# Patient Record
Sex: FEMALE | Race: BLACK OR AFRICAN AMERICAN | Employment: FULL TIME | URBAN - METROPOLITAN AREA
[De-identification: names, ages, dates, MRNs, and addresses within clinical notes are randomized per-mention and may not be internally consistent; named-entity substitution may affect disease eponyms.]

---

## 2024-07-24 ENCOUNTER — HOSPITAL ENCOUNTER (EMERGENCY)
Age: 29
Discharge: HOME | End: 2024-07-24

## 2024-07-24 ENCOUNTER — APPOINTMENT (OUTPATIENT)
Dept: CT IMAGING | Age: 29
End: 2024-07-24

## 2024-07-24 VITALS
BODY MASS INDEX: 41.65 KG/M2 | HEART RATE: 71 BPM | HEIGHT: 65 IN | SYSTOLIC BLOOD PRESSURE: 110 MMHG | RESPIRATION RATE: 16 BRPM | DIASTOLIC BLOOD PRESSURE: 63 MMHG | OXYGEN SATURATION: 97 % | WEIGHT: 250 LBS | TEMPERATURE: 98.2 F

## 2024-07-24 DIAGNOSIS — N39.0 URINARY TRACT INFECTION, BACTERIAL: Primary | ICD-10-CM

## 2024-07-24 DIAGNOSIS — A49.9 URINARY TRACT INFECTION, BACTERIAL: Primary | ICD-10-CM

## 2024-07-24 LAB
ALBUMIN SERPL-MCNC: 4.2 GM/DL (ref 3.4–5)
ALP BLD-CCNC: 93 IU/L (ref 40–128)
ALT SERPL-CCNC: 14 U/L (ref 10–40)
ANION GAP SERPL CALCULATED.3IONS-SCNC: 12 MMOL/L (ref 7–16)
AST SERPL-CCNC: 18 IU/L (ref 15–37)
BACTERIA: ABNORMAL /HPF
BASOPHILS ABSOLUTE: 0 K/CU MM
BASOPHILS RELATIVE PERCENT: 0.3 % (ref 0–1)
BILIRUB SERPL-MCNC: 0.2 MG/DL (ref 0–1)
BILIRUBIN, URINE: NEGATIVE MG/DL
BLOOD, URINE: ABNORMAL
BUN SERPL-MCNC: 17 MG/DL (ref 6–23)
CALCIUM SERPL-MCNC: 9.8 MG/DL (ref 8.3–10.6)
CHLORIDE BLD-SCNC: 107 MMOL/L (ref 99–110)
CLARITY, UA: ABNORMAL
CO2: 24 MMOL/L (ref 21–32)
COLOR, UA: YELLOW
CREAT SERPL-MCNC: 1 MG/DL (ref 0.6–1.1)
DIFFERENTIAL TYPE: ABNORMAL
EOSINOPHILS ABSOLUTE: 0.2 K/CU MM
EOSINOPHILS RELATIVE PERCENT: 1.5 % (ref 0–3)
GFR, ESTIMATED: 80 ML/MIN/1.73M2
GLUCOSE SERPL-MCNC: 91 MG/DL (ref 70–99)
GLUCOSE URINE: NEGATIVE MG/DL
HCT VFR BLD CALC: 43.6 % (ref 37–47)
HEMOGLOBIN: 13.6 GM/DL (ref 12.5–16)
IMMATURE NEUTROPHIL %: 0.2 % (ref 0–0.43)
INTERPRETATION: NORMAL
KETONES, URINE: ABNORMAL MG/DL
LEUKOCYTE ESTERASE, URINE: ABNORMAL
LIPASE: 35 IU/L (ref 13–60)
LYMPHOCYTES ABSOLUTE: 3 K/CU MM
LYMPHOCYTES RELATIVE PERCENT: 27.3 % (ref 24–44)
Lab: NORMAL
MCH RBC QN AUTO: 26.8 PG (ref 27–31)
MCHC RBC AUTO-ENTMCNC: 31.2 % (ref 32–36)
MCV RBC AUTO: 85.8 FL (ref 78–100)
MONOCYTES ABSOLUTE: 0.7 K/CU MM
MONOCYTES RELATIVE PERCENT: 6.6 % (ref 0–4)
MUCUS: ABNORMAL HPF
NEUTROPHILS ABSOLUTE: 7.1 K/CU MM
NEUTROPHILS RELATIVE PERCENT: 64.1 % (ref 36–66)
NITRITE URINE, QUANTITATIVE: NEGATIVE
NUCLEATED RBC %: 0 %
PDW BLD-RTO: 14.2 % (ref 11.7–14.9)
PH, URINE: 6 (ref 5–8)
PLATELET # BLD: 468 K/CU MM (ref 140–440)
PMV BLD AUTO: 9.6 FL (ref 7.5–11.1)
POTASSIUM SERPL-SCNC: 4.3 MMOL/L (ref 3.5–5.1)
PREGNANCY, URINE: NEGATIVE
PROTEIN UA: NEGATIVE MG/DL
RBC # BLD: 5.08 M/CU MM (ref 4.2–5.4)
RBC URINE: 4 /HPF (ref 0–6)
SODIUM BLD-SCNC: 143 MMOL/L (ref 135–145)
SPECIFIC GRAVITY UA: >1.03 (ref 1–1.03)
SPECIMEN: NORMAL
SQUAMOUS EPITHELIAL: 49 /HPF
TOTAL IMMATURE NEUTOROPHIL: 0.02 K/CU MM
TOTAL NUCLEATED RBC: 0 K/CU MM
TOTAL PROTEIN: 8.1 GM/DL (ref 6.4–8.2)
TRICHOMONAS: ABNORMAL /HPF
UROBILINOGEN, URINE: 0.2 MG/DL (ref 0.2–1)
WBC # BLD: 11 K/CU MM (ref 4–10.5)
WBC UA: 6 /HPF (ref 0–5)
WET PREP: NORMAL
WET PREP: NORMAL

## 2024-07-24 PROCEDURE — 96374 THER/PROPH/DIAG INJ IV PUSH: CPT

## 2024-07-24 PROCEDURE — 6360000002 HC RX W HCPCS

## 2024-07-24 PROCEDURE — 99285 EMERGENCY DEPT VISIT HI MDM: CPT

## 2024-07-24 PROCEDURE — 6360000004 HC RX CONTRAST MEDICATION

## 2024-07-24 PROCEDURE — 87086 URINE CULTURE/COLONY COUNT: CPT

## 2024-07-24 PROCEDURE — 74177 CT ABD & PELVIS W/CONTRAST: CPT

## 2024-07-24 PROCEDURE — 80053 COMPREHEN METABOLIC PANEL: CPT

## 2024-07-24 PROCEDURE — 83690 ASSAY OF LIPASE: CPT

## 2024-07-24 PROCEDURE — 85025 COMPLETE CBC W/AUTO DIFF WBC: CPT

## 2024-07-24 PROCEDURE — 81025 URINE PREGNANCY TEST: CPT

## 2024-07-24 PROCEDURE — 81001 URINALYSIS AUTO W/SCOPE: CPT

## 2024-07-24 PROCEDURE — 87210 SMEAR WET MOUNT SALINE/INK: CPT

## 2024-07-24 PROCEDURE — 87591 N.GONORRHOEAE DNA AMP PROB: CPT

## 2024-07-24 PROCEDURE — 87491 CHLMYD TRACH DNA AMP PROBE: CPT

## 2024-07-24 RX ORDER — SODIUM CHLORIDE 0.9 % (FLUSH) 0.9 %
5-40 SYRINGE (ML) INJECTION 2 TIMES DAILY
Status: DISCONTINUED | OUTPATIENT
Start: 2024-07-24 | End: 2024-07-25 | Stop reason: HOSPADM

## 2024-07-24 RX ORDER — CEPHALEXIN 500 MG/1
500 CAPSULE ORAL 4 TIMES DAILY
Qty: 28 CAPSULE | Refills: 0 | Status: SHIPPED | OUTPATIENT
Start: 2024-07-24 | End: 2024-07-31

## 2024-07-24 RX ORDER — KETOROLAC TROMETHAMINE 15 MG/ML
15 INJECTION, SOLUTION INTRAMUSCULAR; INTRAVENOUS ONCE
Status: COMPLETED | OUTPATIENT
Start: 2024-07-24 | End: 2024-07-24

## 2024-07-24 RX ADMIN — IOPAMIDOL 75 ML: 755 INJECTION, SOLUTION INTRAVENOUS at 20:11

## 2024-07-24 RX ADMIN — KETOROLAC TROMETHAMINE 15 MG: 15 INJECTION, SOLUTION INTRAMUSCULAR; INTRAVENOUS at 18:51

## 2024-07-24 NOTE — DISCHARGE INSTRUCTIONS
Leanna luis [facility]  2-1-1:   Enfòmasyon aurelia calderon  [phone] o 2-1-1  [email or web address]    Phu Godinez:  koupon kurt noreen, melany metzger, gadri sibvansyone, PRC  [phone]    [facility]:   noreen, èd ak lwjuse, rad ak b  [phone] or [phone]    Americo hendrickson:   monica sorto ak lwaye, rad ak b  [phone]    [facility] [address]:     [facility]    [address]  [address] [place] [address]  [phone]     fritz godinez yo  [address] [place] [address]  [phone]     [facility]:   klinik sante pedyatrik, klinik sante granmoun ak pemlasha dapre revni ou  [address] [place], [place]   [phone] o [phone]    [facility]ael barry [facility]:  asistans kurt [facility]: [phone]  [email or web address]: asistans kurt [facility]: [facility], klinik sante granmovan barry pemlasha selon revni ou  [address]   [place] [address]  [phone]    [facility] [place]:  Nitrisyon kurt michelle denise chwa selon revni  [address], [place] [id number] [phone]    [facility]:  ofri sèvis otobis [facility]. ADA ofri sèvis pòt an pòt kurt moun ki gen andikap  [phone]    Make yon vwayaj:    [phone]      [facility], Asitaye luis [facility], [address] and [address]  Kirby 4:00pm-7:00PM  Mekredi 4:00pm-7:00pm  Samdi 9:00am-12:00pm  ___________________________________________________________________                                                                                         OB[facility]GYN,LTD  [facility]  Dr Twan Messer DO,FACOG  Dr Jonas Greer MD, FACOG  RITA Rosas NP  Located at [address] [place] [address]  Call to schedule appt.

## 2024-07-24 NOTE — CARE COORDINATION
CM review of pt chart for discharge needs. Pt is [place] creole vernacular. [place] [facility] [employer] community program placed in pt AVS for community resources, OBGYN o/p list.

## 2024-07-24 NOTE — ED TRIAGE NOTES
Patient presents with c/o of lower right abdominal pain, vaginal itching with \"pimples\" present, and spotting. Patient adds she is also experiencing painful urination.

## 2024-07-24 NOTE — ED PROVIDER NOTES
women exam      DISCHARGE MEDICATIONS:  New Prescriptions    CEPHALEXIN (KEFLEX) 500 MG CAPSULE    Take 1 capsule by mouth 4 times daily for 7 days       DISCONTINUED MEDICATIONS:  Discontinued Medications    No medications on file              (Please note that portions of this note were completed with a voice recognition program.  Efforts were made to edit the dictations but occasionally words are mis-transcribed.)    ARASELI Elias CNP (electronically signed)        Iris Gaytan APRN - CNP  07/24/24 9103

## 2024-07-26 LAB
CULTURE: NORMAL
Lab: NORMAL
SPECIMEN: NORMAL

## 2024-07-27 LAB
C TRACH RRNA SPEC QL NAA+PROBE: NEGATIVE
N GONORRHOEA RRNA SPEC QL NAA+PROBE: NEGATIVE